# Patient Record
(demographics unavailable — no encounter records)

---

## 2025-04-14 NOTE — ASSESSMENT
[FreeTextEntry1] : Patient is a right thumb mass consistent with a mucous cyst.  Options include surgical excision of the mass versus drainage versus observation and she is opting for drainage was done today in the office typical mucinous material was returned bandage was applied she will contemplate the surgical excision of it under local anesthesia if the mass would recur

## 2025-04-14 NOTE — HISTORY OF PRESENT ILLNESS
[de-identified] : 67-year-old female mass present on her right thumb.  Comes in today for evaluation.  She did have trauma she says to her thumb many years ago.  Now she has developed this mass over the past several months.  Does not cause her significant pain or discomfort.

## 2025-04-14 NOTE — PHYSICAL EXAM
[de-identified] : Patient has a mass present close to IP joint of the right thumb.  Consistent with a mucous cyst.  There is a bone spur present just distal to the mass around the joint.  There is normal capillary refill.  No erythema ecchymoses or abrasions

## 2025-04-14 NOTE — PROCEDURE
[FreeTextEntry3] : I aspirated the cyst under aseptic conditions Patient tolerated it well They understand 50/50 recurrence Return prn Right thumb mucous cyst